# Patient Record
Sex: FEMALE | Race: WHITE | Employment: UNEMPLOYED | URBAN - NONMETROPOLITAN AREA
[De-identification: names, ages, dates, MRNs, and addresses within clinical notes are randomized per-mention and may not be internally consistent; named-entity substitution may affect disease eponyms.]

---

## 2020-02-10 ENCOUNTER — HOSPITAL ENCOUNTER (OUTPATIENT)
Dept: PAIN MANAGEMENT | Age: 45
Discharge: HOME | End: 2020-02-10
Payer: MEDICAID

## 2020-02-10 VITALS
TEMPERATURE: 97.1 F | HEIGHT: 65 IN | HEART RATE: 74 BPM | RESPIRATION RATE: 18 BRPM | DIASTOLIC BLOOD PRESSURE: 89 MMHG | WEIGHT: 213.38 LBS | OXYGEN SATURATION: 97 % | SYSTOLIC BLOOD PRESSURE: 145 MMHG | BODY MASS INDEX: 35.55 KG/M2

## 2020-02-10 PROBLEM — M51.36 DDD (DEGENERATIVE DISC DISEASE), LUMBAR: Status: ACTIVE | Noted: 2020-02-10

## 2020-02-10 PROBLEM — M54.16 LUMBAR RADICULAR PAIN: Status: ACTIVE | Noted: 2020-02-10

## 2020-02-10 PROBLEM — R52 PAIN MANAGEMENT: Status: ACTIVE | Noted: 2020-02-10

## 2020-02-10 PROCEDURE — 99205 OFFICE O/P NEW HI 60 MIN: CPT

## 2020-02-10 PROCEDURE — 99204 OFFICE O/P NEW MOD 45 MIN: CPT | Performed by: NURSE PRACTITIONER

## 2020-02-10 RX ORDER — BUPROPION HYDROCHLORIDE 150 MG/1
20 TABLET ORAL DAILY
COMMUNITY
Start: 2020-01-20

## 2020-02-10 RX ORDER — TIZANIDINE 4 MG/1
TABLET ORAL
Qty: 60 TABLET | Refills: 1 | Status: SHIPPED | OUTPATIENT
Start: 2020-02-10 | End: 2020-06-22 | Stop reason: SDUPTHER

## 2020-02-10 RX ORDER — IBUPROFEN 800 MG/1
800 TABLET ORAL 3 TIMES DAILY PRN
COMMUNITY
Start: 2020-01-20 | End: 2020-06-22

## 2020-02-10 RX ORDER — CYCLOBENZAPRINE HCL 10 MG
10 TABLET ORAL 3 TIMES DAILY PRN
COMMUNITY
End: 2020-06-22

## 2020-02-10 RX ORDER — LISINOPRIL 20 MG/1
20 TABLET ORAL DAILY
COMMUNITY
Start: 2020-01-20

## 2020-02-10 ASSESSMENT — ENCOUNTER SYMPTOMS
DIARRHEA: 0
NAUSEA: 0
WHEEZING: 0
TROUBLE SWALLOWING: 0
SHORTNESS OF BREATH: 0
SORE THROAT: 0
CONSTIPATION: 0
BACK PAIN: 1
PHOTOPHOBIA: 1

## 2020-02-10 ASSESSMENT — PAIN DESCRIPTION - PAIN TYPE: TYPE: CHRONIC PAIN

## 2020-02-10 ASSESSMENT — PAIN DESCRIPTION - PROGRESSION: CLINICAL_PROGRESSION: NOT CHANGED

## 2020-02-10 ASSESSMENT — PAIN SCALES - GENERAL: PAINLEVEL_OUTOF10: 8

## 2020-02-10 ASSESSMENT — PAIN DESCRIPTION - FREQUENCY: FREQUENCY: CONTINUOUS

## 2020-02-10 ASSESSMENT — PAIN DESCRIPTION - ONSET: ONSET: ON-GOING

## 2020-02-10 ASSESSMENT — PAIN - FUNCTIONAL ASSESSMENT: PAIN_FUNCTIONAL_ASSESSMENT: PREVENTS OR INTERFERES SOME ACTIVE ACTIVITIES AND ADLS

## 2020-02-10 NOTE — PROGRESS NOTES
Nursing Admission Record    Current Issues / Falls / ER Visits: chronic back pain, carpal tunnel and left knee pain   States she has fallen on her knees a coupla times last time was on conrete    Has had problems since she was teenager with her back    Injection In the Past? yes  When 11/19                                              [facility]    Was Percentage of Pain Relief after Last Procedure:  60 %    How long lasted:  2 weeks  Were you able to decrease pain medication after treatment? No  Were you able to increase activity after treatment? Yes  Did you have any adverse reactions to treatment? No    Opioids Prescribed: No    Was Medication Brought to Appt: No    Hx of any Neck/Back Surgeries? no    Is Patient currently taking a blood thinner? no    MRI exams received in the past 2 years:  Yes       When                                               Where       Imaging on chart: Yes         Imaging records requested: Yes    CT exam received during the last 12 months: No       When                                               Where       Imaging on chart: No         Imaging records requested: No    X-ray exam received during the last 12 months: Yes       When                                               Where       Imaging on chart: Yes         Imaging records requested: Yes    Nerve Conduction Study/EMG:  No       When                                               Where       Imaging on chart: No         Imaging records requested: No    Physical therapy: Yes  layed her on her back and it hurt stated it did not help       When:                         Where     Behavior Health No       When:                         Where     Labs  No       When:                                              Where     PEG Score: 8.6    Last PEG Score:      Annual ORT Score: 4    Annual PHQ Score: 18    Last UDS Results:    Education Provided:  [] Review of Kya Ewing  [] Agreement

## 2020-02-10 NOTE — H&P
Progression: Not changed  Functional Pain Assessment: Prevents or interferes some active activities and ADLs  Non-Pharmaceutical Pain Intervention(s): Repositioned    Employment: no    Previous Injury: yes    Previous Physical Therapy In the last 6 months? Yes    Did Physical Therapy make the pain better? No     MRI in the two last year? Yes  OF: lumbar see images below    CT scan in last 12 months? No    X-ray last 12 months? Yes    Nerve Conduction Study / EMG No    Injections in the past? Yes    Did the injections help relieve the pain? Yes- LESI patient was seen at pain clinic in [facility]    Past Medical History  No past medical history on file. Medications  Current Outpatient Medications   Medication Sig Dispense Refill    tiZANidine (ZANAFLEX) 4 MG tablet 1/4 tablet with meals 1/2 to whole tablet at bedtime 60 tablet 1    buPROPion (WELLBUTRIN XL) 150 MG extended release tablet Take 20 mg by mouth daily      cyclobenzaprine (FLEXERIL) 10 MG tablet Take 10 mg by mouth 3 times daily as needed      ibuprofen (ADVIL;MOTRIN) 800 MG tablet Take 800 mg by mouth 3 times daily as needed      lisinopril (PRINIVIL;ZESTRIL) 20 MG tablet Take 20 mg by mouth daily       No current facility-administered medications for this encounter. Allergies  Codeine and Cephalosporins    Current Medications  Current Outpatient Medications   Medication Sig Dispense Refill    tiZANidine (ZANAFLEX) 4 MG tablet 1/4 tablet with meals 1/2 to whole tablet at bedtime 60 tablet 1    buPROPion (WELLBUTRIN XL) 150 MG extended release tablet Take 20 mg by mouth daily      cyclobenzaprine (FLEXERIL) 10 MG tablet Take 10 mg by mouth 3 times daily as needed      ibuprofen (ADVIL;MOTRIN) 800 MG tablet Take 800 mg by mouth 3 times daily as needed      lisinopril (PRINIVIL;ZESTRIL) 20 MG tablet Take 20 mg by mouth daily       No current facility-administered medications for this encounter.         Social History Social History     Socioeconomic History    Marital status:      Spouse name: Not on file    Number of children: Not on file    Years of education: Not on file    Highest education level: Not on file   Occupational History    Not on file   Social Needs    Financial resource strain: Not on file    Food insecurity:     Worry: Not on file     Inability: Not on file    Transportation needs:     Medical: Not on file     Non-medical: Not on file   Tobacco Use    Smoking status: Not on file   Substance and Sexual Activity    Alcohol use: Not on file    Drug use: Not on file    Sexual activity: Not on file   Lifestyle    Physical activity:     Days per week: Not on file     Minutes per session: Not on file    Stress: Not on file   Relationships    Social connections:     Talks on phone: Not on file     Gets together: Not on file     Attends Sabianism service: Not on file     Active member of club or organization: Not on file     Attends meetings of clubs or organizations: Not on file     Relationship status: Not on file    Intimate partner violence:     Fear of current or ex partner: Not on file     Emotionally abused: Not on file     Physically abused: Not on file     Forced sexual activity: Not on file   Other Topics Concern    Not on file   Social History Narrative    Not on file         Family History  family history is not on file. Review of Systems:  Review of Systems   Constitutional: Positive for fatigue. Negative for chills, diaphoresis and fever. HENT: Negative for nosebleeds, sore throat and trouble swallowing. Eyes: Positive for photophobia ( chronic left eye) and visual disturbance ( chornic left eye). Respiratory: Negative for shortness of breath and wheezing. Cardiovascular: Negative for chest pain. Gastrointestinal: Negative for constipation, diarrhea and nausea. Genitourinary: Negative for dysuria and flank pain.    Musculoskeletal: Positive for arthralgias, back time.      Motor: No tremor or atrophy. Gait: Gait abnormal.      Deep Tendon Reflexes:      Reflex Scores:       Tricep reflexes are 2+ on the right side and 2+ on the left side. Bicep reflexes are 2+ on the right side and 2+ on the left side. Brachioradialis reflexes are 2+ on the right side and 2+ on the left side. Patellar reflexes are 2+ on the right side and 2+ on the left side. Achilles reflexes are 2+ on the right side and 2+ on the left side. Comments: Bilateral upper arm strength essentially equal with encouragement    Bilateral leg strength essentially equal with encouragement on exam negative foot drop reports bilateral L5 dermatome pain   Psychiatric:         Attention and Perception: Attention and perception normal.         Mood and Affect: Mood and affect normal.         Speech: Speech normal.         Behavior: Behavior normal. Behavior is not withdrawn or combative. Thought Content: Thought content normal. Thought content does not include homicidal or suicidal ideation. Judgment: Judgment normal.      Comments: Alert and verbal         LAST UDS: new patient    LABS:     No results found for: NA, K, CL, CO2, BUN, CREATININE, GLUCOSE, CALCIUM     No results found for: WBC, HGB, HCT, MCV, PLT    Recent NVC/EMG:nka    Recent Imaging:  I have obtained MRI from [facility] 10/22/2019 lumbar spine \"normal lumbar alignment Vertebral body showed no fracture relative disc dissociation at L4-L5 and L5-S1. Miotic degenerative endplate changes are present at L4-L5. T12-L3 no significant disc bulge facet DJD or stenosis. L3-4 minimal facet disc bulge right greater than left facet arthropathy. Mild canal stenosis mild right neuroforaminal narrowing. L4-L5 moderate facet arthropathy. Hypertrophy of ligamentum flavum. Disc bulge diffuse osteophyte complex. There is no significant central canal stenosis.   moderate right moderate left neuroforaminal stenosis          _______________________________________________________                  Assessment:                                                                                                                                        Principal Problem:    Lumbar radicular pain  Active Problems:    DDD (degenerative disc disease), lumbar    Pain management    Myofascial pain  Resolved Problems:    * No resolved hospital problems. *      PLAN:  1. Discussed and reviewed policy at this facility including prescriptions  2. Continue home stretches and exercises for lumbar spine  3. Discussed and will order lumbar epidural steroid injection level L5-S1 Dr. Daily Barber. Patient with L5-S1 dermatome pain effectiveness from LESI in the past.  4.  Continue follow up with primary care  5. She remains taken Flexeril and Motrin. Discussed changing to Zanaflex at some point also discussed Flector patches  6. Did review [facility] report  7. Did not discuss opiates today-dose opiates may be possible in future  8. I discussed spine issues and any change in neurological status may need emergency follow-up. Discussion: Discussed exam findings and plan of care with patient. Patient agreed with POC and questions were asked and answered. New patient evaluation today. I read referral notes, discussed H&P, and read images and reviewed. Patient with a chronic history of lower back pain radicular leg pain effectiveness from LESI in the past.  Takes Flexeril Motrin. I discussed physical therapy low back. Apparently has attempted this recently. Continues do home stretches and exercises. We discussed repeating LESI at some time we may need new images and/or nerve study lower legs. Opiate medications was not discussed. Hervisit was 40 minutes    Activity: discussed exercise as beneficial to pain reduction, encouraged stretching exercise with a focus ontorso strengthening.     Education Provided by Provider  Review of Marvie Ends / Agreement Review / ORT Calculated / PHQ-9 Reviewed / Compliance Issues Discussed / Cognitive Behavior Needs / Exercise / Review of Test / Financial Issues / Teaching / Smoking Cessation / Tobacco/Alcohol Use    Controlled Substance Monitoring:  Discussed possible narcotic pain medication side effects, risk of tolerance and/or dependence, and alternative treatments. Discussed the effects of long term narcotic use. Patient encouraged to set daily goals of exercising and if on narcotics to decrease daily narcotic intake. CC:  Carmelita Gusman MD    Thank you for this kind referral and allowing me to participate in your patients care. Nikielidia Meléndez, APRN, 2/23/2020 at10:43 AM    EMR dragon/transcription disclaimer: Much of this encounter note is electronic transcription/translation of spoken language to printed tach. Electronic translation of spoken language may be erroneous, or at times, nonsensical words or phrases may be inadvertently transcribed.  Although, I have reviewed the note for such errors, some may still exist.

## 2020-02-23 PROBLEM — M79.18 MYOFASCIAL PAIN: Status: ACTIVE | Noted: 2020-02-23

## 2020-02-28 ENCOUNTER — HOSPITAL ENCOUNTER (OUTPATIENT)
Dept: PAIN MANAGEMENT | Age: 45
Discharge: HOME | End: 2020-02-28
Payer: MEDICAID

## 2020-02-28 VITALS
HEART RATE: 73 BPM | SYSTOLIC BLOOD PRESSURE: 146 MMHG | DIASTOLIC BLOOD PRESSURE: 90 MMHG | RESPIRATION RATE: 18 BRPM | OXYGEN SATURATION: 93 % | TEMPERATURE: 96.9 F

## 2020-02-28 PROCEDURE — 2500000003 HC RX 250 WO HCPCS

## 2020-02-28 PROCEDURE — 3209999900 FLUORO FOR SURGICAL PROCEDURES

## 2020-02-28 PROCEDURE — 62323 NJX INTERLAMINAR LMBR/SAC: CPT

## 2020-02-28 PROCEDURE — 6360000002 HC RX W HCPCS

## 2020-02-28 PROCEDURE — 2580000003 HC RX 258

## 2020-02-28 RX ORDER — SODIUM CHLORIDE 9 MG/ML
INJECTION INTRAVENOUS
Status: COMPLETED | OUTPATIENT
Start: 2020-02-28 | End: 2020-02-28

## 2020-02-28 RX ORDER — LIDOCAINE HYDROCHLORIDE 10 MG/ML
INJECTION, SOLUTION EPIDURAL; INFILTRATION; INTRACAUDAL; PERINEURAL
Status: COMPLETED | OUTPATIENT
Start: 2020-02-28 | End: 2020-02-28

## 2020-02-28 RX ORDER — METHYLPREDNISOLONE ACETATE 80 MG/ML
INJECTION, SUSPENSION INTRA-ARTICULAR; INTRALESIONAL; INTRAMUSCULAR; SOFT TISSUE
Status: COMPLETED | OUTPATIENT
Start: 2020-02-28 | End: 2020-02-28

## 2020-02-28 RX ADMIN — METHYLPREDNISOLONE ACETATE 80 MG: 80 INJECTION, SUSPENSION INTRA-ARTICULAR; INTRALESIONAL; INTRAMUSCULAR; SOFT TISSUE at 09:13

## 2020-02-28 RX ADMIN — SODIUM CHLORIDE 5 ML: 9 INJECTION INTRAVENOUS at 09:12

## 2020-02-28 RX ADMIN — LIDOCAINE HYDROCHLORIDE 5 ML: 10 INJECTION, SOLUTION EPIDURAL; INFILTRATION; INTRACAUDAL; PERINEURAL at 09:12

## 2020-02-28 ASSESSMENT — PAIN - FUNCTIONAL ASSESSMENT
PAIN_FUNCTIONAL_ASSESSMENT: 0-10
PAIN_FUNCTIONAL_ASSESSMENT: 0-10

## 2020-02-28 NOTE — PROGRESS NOTES
Procedure:  Level of Consciousness: [x]Alert [x]Oriented []Disoriented []Lethargic  Anxiety Level: [x]Calm []Anxious []Depressed []Other  Skin: [x]Warm [x]Dry []Cool []Moist []Intact []Other  Cardiovascular: []Palpitations: [x]Never []Occasionally []Frequently  Chest Pain: [x]No []Yes  Respiratory:  [x]Unlabored []Labored []Cough ([] Productive []Unproductive)  HCG Required: []No [x]Yes   Results: [x]Negative []Positive  Knowledge Level:        [x]Patient/Other verbalized understanding of pre-procedure instructions. [x]Assessment of post-op care needs (transportation, responsible caregiver)        [x]Able to discuss health care problems and how to deal with it.   Factors that Affect Teaching:        Language Barrier: [x]No []Yes - why:        Hearing Loss:        [x]No []Yes            Corrective Device:  []Yes []No        Vision Loss:           [x]No []Yes            Corrective Device:  []Yes []No        Memory Loss:       [x]No []Yes            []Short Term []Long Term  Motivational Level:  [x]Asks Questions                  []Extremely Anxious       [x]Seems Interested               []Seems Uninterested                  []Denies need for Education  Risk for Injury:  [x]Patient oriented to person, place and time  []History of frequent falls/loss of balance  Nutritional:  []Change in appetite   []Weight Gain   []Weight Loss  Functional:  []Requires assistance with ADL's

## 2020-03-02 ENCOUNTER — TELEPHONE (OUTPATIENT)
Dept: PAIN MANAGEMENT | Age: 45
End: 2020-03-02

## 2020-06-22 ENCOUNTER — HOSPITAL ENCOUNTER (OUTPATIENT)
Dept: PAIN MANAGEMENT | Age: 45
Discharge: HOME | End: 2020-06-22
Payer: MEDICAID

## 2020-06-22 VITALS
RESPIRATION RATE: 18 BRPM | OXYGEN SATURATION: 98 % | DIASTOLIC BLOOD PRESSURE: 116 MMHG | SYSTOLIC BLOOD PRESSURE: 190 MMHG | HEIGHT: 65 IN | BODY MASS INDEX: 37.32 KG/M2 | WEIGHT: 224 LBS | HEART RATE: 84 BPM | TEMPERATURE: 97.4 F

## 2020-06-22 PROCEDURE — 99215 OFFICE O/P EST HI 40 MIN: CPT

## 2020-06-22 PROCEDURE — 99214 OFFICE O/P EST MOD 30 MIN: CPT | Performed by: NURSE PRACTITIONER

## 2020-06-22 RX ORDER — ROPINIROLE 0.25 MG/1
0.25 TABLET, FILM COATED ORAL NIGHTLY
Qty: 90 TABLET | Refills: 2 | Status: SHIPPED | OUTPATIENT
Start: 2020-06-22

## 2020-06-22 RX ORDER — TIZANIDINE 4 MG/1
TABLET ORAL
Qty: 60 TABLET | Refills: 1 | Status: SHIPPED | OUTPATIENT
Start: 2020-06-22

## 2020-06-22 RX ORDER — LIDOCAINE 50 MG/G
2 PATCH TOPICAL DAILY
Qty: 60 PATCH | Refills: 2 | Status: SHIPPED | OUTPATIENT
Start: 2020-06-22 | End: 2020-09-20

## 2020-06-22 ASSESSMENT — PAIN DESCRIPTION - ORIENTATION: ORIENTATION: LOWER;UPPER

## 2020-06-22 ASSESSMENT — PAIN DESCRIPTION - PAIN TYPE: TYPE: CHRONIC PAIN

## 2020-06-22 ASSESSMENT — PAIN SCALES - GENERAL: PAINLEVEL_OUTOF10: 8

## 2020-06-22 ASSESSMENT — PAIN DESCRIPTION - LOCATION: LOCATION: BACK

## 2020-06-22 NOTE — H&P
Take 20 mg by mouth daily      lisinopril (PRINIVIL;ZESTRIL) 20 MG tablet Take 20 mg by mouth daily       No current facility-administered medications for this encounter. Social History    Social History     Socioeconomic History    Marital status:      Spouse name: None    Number of children: None    Years of education: None    Highest education level: None   Occupational History    None   Social Needs    Financial resource strain: None    Food insecurity     Worry: None     Inability: None    Transportation needs     Medical: None     Non-medical: None   Tobacco Use    Smoking status: Never Smoker    Smokeless tobacco: Never Used   Substance and Sexual Activity    Alcohol use: Not Currently    Drug use: Yes     Types: Marijuana    Sexual activity: None   Lifestyle    Physical activity     Days per week: None     Minutes per session: None    Stress: None   Relationships    Social connections     Talks on phone: None     Gets together: None     Attends Yazidism service: None     Active member of club or organization: None     Attends meetings of clubs or organizations: None     Relationship status: None    Intimate partner violence     Fear of current or ex partner: None     Emotionally abused: None     Physically abused: None     Forced sexual activity: None   Other Topics Concern    None   Social History Narrative    None         Family History  family history is not on file. Review of Systems:  Review of Systems   Constitutional: Positive for activity change. Gastrointestinal: Negative for bowel incontinence and constipation. Genitourinary: Negative for bladder incontinence. Musculoskeletal: Positive for arthralgias, back pain and myalgias. Negative for neck pain. Neurological: Positive for numbness. Negative for weakness. Psychiatric/Behavioral: Positive for sleep disturbance. Negative for agitation, self-injury and suicidal ideas.  The patient is not nervous/anxious. 14 point ROS negative besides that noted in HPI    Physical exam:     No data found. Body mass index is 37.28 kg/m². Physical Exam  Vitals signs and nursing note reviewed. Constitutional:       General: She is not in acute distress. Appearance: She is well-developed. HENT:      Head: Normocephalic. Right Ear: External ear normal.      Left Ear: External ear normal.      Nose: Nose normal.   Eyes:      Conjunctiva/sclera: Conjunctivae normal.      Pupils: Pupils are equal, round, and reactive to light. Neck:      Vascular: No JVD. Trachea: No tracheal deviation. Cardiovascular:      Rate and Rhythm: Normal rate. Pulmonary:      Effort: Pulmonary effort is normal.   Abdominal:      General: There is no distension. Tenderness: There is no abdominal tenderness. Musculoskeletal:      Comments: + distraction, thigh thrust and gloria's. Right SI Tender to palpation    Skin:     General: Skin is warm and dry. Neurological:      Mental Status: She is alert and oriented to person, place, and time. Cranial Nerves: No cranial nerve deficit. Psychiatric:         Behavior: Behavior normal.         Thought Content: Thought content normal.         Judgment: Judgment normal.         Diagnostics:    MRI exams received in the past 2 years:  Yes MRI Lumbar Spine       When 10/2019                                              Where [facility]       Imaging on chart: Yes         Imaging records requested: No     CT exam received during the last 12 months: No       When na                                              Where na       Imaging on chart: No         Imaging records requested: No     X-ray exam received during the last 12 months: No       When na                                              Where na       Imaging on chart: No         Imaging records requested:  No     Nerve Conduction Study/EMG:  No       When na 5. Pain management contract agreement   -Follow up post procedure   -Follow up in 8-10 weeks to evaluate medications   -Random UDS as indicated by ORT score or if indicated by abberent behaviors    Discussion: Discussed exam findings and plan of care with patient. Patient agreed with POC and questions were asked and answered. Activity: discussed exercise as beneficial to pain reduction, encouraged stretching exercise with a focus on torso strengthening. Goals:  Pain Management Goals of Therapy:   [] Resolution in pain  [x] Decrease in pain level  [x] Improvement in ADL's  [x] Increase in activities with less pain  [] Decrease in medication      Controlled substance monitoring:    [x] Discussed medication side effects, risk of tolerance and/or dependence, and/or alternative treatment  [] Discussed the detrimental effects of long term narcotic use in younger patients especially women of childbearing years. [x] No signs and symptoms of potential drug abuse or diversion were identified  [] Signs of potential drug abuse or diversion were identified   [] ORT Score   [] UDS non-compliant   [] See Note  [x] Random urine drug screen sent today  [] Random urine drug screen not completed today   [] Deferred New Patient  [] Compliant   [] Not Compliant see note  [x] Medication agreement with provider signed today 6/22/2020  [] Medication agreement with provider on file under media   [] Medication regimen effective with c/o of side effects noted and continued   [] New patient continuing current medication while developing POC   [x] On going assessment and evaluation of medication regimen  [] Medication regimen not effective see plan for changes  [x] Rita Cobb reviewed & on file under media     CC:  MD Marquis Thelma Jenkins, BRODERICK - CNP, 6/23/2020 at 6:50 PM    EMR dragon/transcription disclaimer: Much of this encounter note is electronic transcription/translation of spoken language to printed tach.

## 2020-06-23 PROBLEM — M53.3 SI (SACROILIAC) JOINT DYSFUNCTION: Status: ACTIVE | Noted: 2020-06-23

## 2020-06-23 PROBLEM — G89.29 CHRONIC BILATERAL LOW BACK PAIN WITHOUT SCIATICA: Status: ACTIVE | Noted: 2020-06-23

## 2020-06-23 PROBLEM — M54.50 CHRONIC BILATERAL LOW BACK PAIN WITHOUT SCIATICA: Status: ACTIVE | Noted: 2020-06-23

## 2020-06-23 PROBLEM — G25.81 RLS (RESTLESS LEGS SYNDROME): Status: ACTIVE | Noted: 2020-06-23

## 2020-06-23 PROBLEM — Z02.89 PAIN MANAGEMENT CONTRACT AGREEMENT: Status: ACTIVE | Noted: 2020-02-10

## 2020-06-23 ASSESSMENT — ENCOUNTER SYMPTOMS
BOWEL INCONTINENCE: 0
BACK PAIN: 1
CONSTIPATION: 0

## 2020-06-24 ENCOUNTER — TELEPHONE (OUTPATIENT)
Dept: PAIN MANAGEMENT | Age: 45
End: 2020-06-24

## 2020-07-01 ENCOUNTER — HOSPITAL ENCOUNTER (OUTPATIENT)
Dept: PAIN MANAGEMENT | Age: 45
Discharge: HOME | End: 2020-07-01
Payer: MEDICAID

## 2020-07-01 ENCOUNTER — HOSPITAL ENCOUNTER (OUTPATIENT)
Dept: GENERAL RADIOLOGY | Age: 45
Discharge: HOME | End: 2020-07-01
Payer: MEDICAID

## 2020-07-01 VITALS
DIASTOLIC BLOOD PRESSURE: 78 MMHG | TEMPERATURE: 97.4 F | SYSTOLIC BLOOD PRESSURE: 159 MMHG | HEART RATE: 93 BPM | RESPIRATION RATE: 18 BRPM | OXYGEN SATURATION: 98 %

## 2020-07-01 PROCEDURE — 76942 ECHO GUIDE FOR BIOPSY: CPT | Performed by: NURSE PRACTITIONER

## 2020-07-01 PROCEDURE — 20611 DRAIN/INJ JOINT/BURSA W/US: CPT

## 2020-07-01 PROCEDURE — 2500000003 HC RX 250 WO HCPCS

## 2020-07-01 PROCEDURE — 20552 NJX 1/MLT TRIGGER POINT 1/2: CPT | Performed by: NURSE PRACTITIONER

## 2020-07-01 PROCEDURE — 6360000002 HC RX W HCPCS

## 2020-07-01 PROCEDURE — 73521 X-RAY EXAM HIPS BI 2 VIEWS: CPT

## 2020-07-01 RX ORDER — BUPIVACAINE HYDROCHLORIDE 5 MG/ML
INJECTION, SOLUTION EPIDURAL; INTRACAUDAL
Status: COMPLETED | OUTPATIENT
Start: 2020-07-01 | End: 2020-07-01

## 2020-07-01 RX ORDER — LIDOCAINE HYDROCHLORIDE 10 MG/ML
INJECTION, SOLUTION EPIDURAL; INFILTRATION; INTRACAUDAL; PERINEURAL
Status: COMPLETED | OUTPATIENT
Start: 2020-07-01 | End: 2020-07-01

## 2020-07-01 RX ORDER — TRIAMCINOLONE ACETONIDE 40 MG/ML
INJECTION, SUSPENSION INTRA-ARTICULAR; INTRAMUSCULAR
Status: COMPLETED | OUTPATIENT
Start: 2020-07-01 | End: 2020-07-01

## 2020-07-01 RX ADMIN — LIDOCAINE HYDROCHLORIDE 2 ML: 10 INJECTION, SOLUTION EPIDURAL; INFILTRATION; INTRACAUDAL; PERINEURAL at 12:54

## 2020-07-01 RX ADMIN — TRIAMCINOLONE ACETONIDE 40 MG: 40 INJECTION, SUSPENSION INTRA-ARTICULAR; INTRAMUSCULAR at 12:55

## 2020-07-01 RX ADMIN — BUPIVACAINE HYDROCHLORIDE 2 ML: 5 INJECTION, SOLUTION EPIDURAL; INTRACAUDAL at 12:54

## 2020-07-01 NOTE — PROCEDURES
[facility] Physical & Pain Medicine    Patient Name: Yvonne Ha    : 1975    Age: 38 y.o. Sex: female    Date: 2020    Pre-op Diagnosis: right  Sacroiliac Joint(s) Dysfunction/ Sacroiliitis    Post-op Diagnosis: right  Sacroiliac Joint(s) Dysfunction/ Sacroliliits    Procedure: Ultrasound Guided Injection of  right Sacroiliac Joint(s)     Performing Procedure:  GABRIELLA Robles - BC, [facility]-BC    Patient Vitals for the past 24 hrs:   BP Temp Temp src Pulse Resp SpO2   20 1247 (!) 159/78 97.4 °F (36.3 °C) Temporal 93 18 98 %       right  Sacroiliac Joint(s)     Description of Procedure:    After voluntary, informed and signed consent obtained the patient was placed in a prone position. Appropriate time out was obtained per policy. The Sacroiliac Joint(s) was palpated for area of maximal tenderness. The area was prepped in an aseptic fashion with CHG swab. The ultrasound transducer was used to confirm the appropriate location. The skin was sprayed with Gebauer's Solution. Under aseptic technique and direct ultrasound visualization a 22 gauge 3 inch spinal needle was introduced into the Sacroiliac Joint(s). After a negative aspiration, a solution of 2 ml of 0.5% Marcaine Plain and 2 ml of 1% Lidocaine Plain and 1 ml of Kenalog (40 mg/ml) was injected into the Sacroiliac Joint(s). The needle was withdrawn and a sterile dressing applied. If this was a bilateral procedure, the same steps were followed on the opposite side. Discharge: The patient tolerated the procedure well. There were no complications during the procedure and the patient was discharged home with discharge instructions. The patient has been instructed to contact the office should there be any complications or questions to arise between today and their next appointment. Plan:    The patient will follow up in the office in approximately 6 weeks.      BRODERICK Blunt CNP, 2020 at 1:02 PM

## 2020-07-07 ENCOUNTER — TELEPHONE (OUTPATIENT)
Dept: PAIN MANAGEMENT | Age: 45
End: 2020-07-07

## 2020-07-16 ENCOUNTER — TELEPHONE (OUTPATIENT)
Dept: PAIN MANAGEMENT | Age: 45
End: 2020-07-16

## 2020-08-27 ENCOUNTER — TELEPHONE (OUTPATIENT)
Dept: PAIN MANAGEMENT | Age: 45
End: 2020-08-27